# Patient Record
Sex: FEMALE | URBAN - METROPOLITAN AREA
[De-identification: names, ages, dates, MRNs, and addresses within clinical notes are randomized per-mention and may not be internally consistent; named-entity substitution may affect disease eponyms.]

---

## 2018-04-09 ENCOUNTER — APPOINTMENT (RX ONLY)
Dept: URBAN - METROPOLITAN AREA CLINIC 89 | Facility: CLINIC | Age: 58
Setting detail: DERMATOLOGY
End: 2018-04-09

## 2018-04-09 DIAGNOSIS — L259 CONTACT DERMATITIS AND OTHER ECZEMA, UNSPECIFIED CAUSE: ICD-10-CM

## 2018-04-09 PROBLEM — L30.8 OTHER SPECIFIED DERMATITIS: Status: ACTIVE | Noted: 2018-04-09

## 2018-04-09 PROCEDURE — 99202 OFFICE O/P NEW SF 15 MIN: CPT

## 2018-04-09 PROCEDURE — ? PRESCRIPTION

## 2018-04-09 RX ORDER — TRIAMCINOLONE ACETONIDE 1 MG/G
CREAM TOPICAL BID
Qty: 1 | Refills: 0 | Status: ERX | COMMUNITY
Start: 2018-04-09

## 2018-04-09 RX ORDER — HYDROCORTISONE 25 MG/G
CREAM TOPICAL
Qty: 1 | Refills: 0 | Status: ERX | COMMUNITY
Start: 2018-04-09

## 2018-04-09 RX ADMIN — TRIAMCINOLONE ACETONIDE: 1 CREAM TOPICAL at 19:46

## 2018-04-09 RX ADMIN — HYDROCORTISONE: 25 CREAM TOPICAL at 19:45

## 2025-05-19 NOTE — HPI: RASH
How Severe Is Your Rash?: moderate
LOV-1/28/2025  NOV- n/andriy  LF-12/02/2024    
Is This A New Presentation, Or A Follow-Up?: Rash
Additional History: Patient states comes and goes and moves around, patient states also that she is tried of taking oral steroids,